# Patient Record
Sex: MALE | Race: BLACK OR AFRICAN AMERICAN | Employment: FULL TIME | URBAN - METROPOLITAN AREA
[De-identification: names, ages, dates, MRNs, and addresses within clinical notes are randomized per-mention and may not be internally consistent; named-entity substitution may affect disease eponyms.]

---

## 2022-12-20 ENCOUNTER — HOSPITAL ENCOUNTER (EMERGENCY)
Age: 27
Discharge: HOME | End: 2022-12-20
Attending: EMERGENCY MEDICINE
Payer: MEDICAID

## 2022-12-20 VITALS
WEIGHT: 166.89 LBS | HEIGHT: 70 IN | RESPIRATION RATE: 15 BRPM | HEART RATE: 60 BPM | TEMPERATURE: 98 F | DIASTOLIC BLOOD PRESSURE: 77 MMHG | OXYGEN SATURATION: 100 % | BODY MASS INDEX: 23.89 KG/M2 | SYSTOLIC BLOOD PRESSURE: 118 MMHG

## 2022-12-20 DIAGNOSIS — A64 STD (MALE): Primary | ICD-10-CM

## 2022-12-20 LAB
C. TRACHOMATIS DNA ,URINE: POSITIVE
N. GONORRHOEAE DNA, URINE: NEGATIVE
URINE TRICHOMONAS EVALUATION: NORMAL

## 2022-12-20 PROCEDURE — 6360000002 HC RX W HCPCS: Performed by: EMERGENCY MEDICINE

## 2022-12-20 PROCEDURE — 99284 EMERGENCY DEPT VISIT MOD MDM: CPT

## 2022-12-20 PROCEDURE — 6370000000 HC RX 637 (ALT 250 FOR IP): Performed by: EMERGENCY MEDICINE

## 2022-12-20 PROCEDURE — 87591 N.GONORRHOEAE DNA AMP PROB: CPT

## 2022-12-20 PROCEDURE — 2500000003 HC RX 250 WO HCPCS: Performed by: EMERGENCY MEDICINE

## 2022-12-20 PROCEDURE — 81015 MICROSCOPIC EXAM OF URINE: CPT

## 2022-12-20 PROCEDURE — 87491 CHLMYD TRACH DNA AMP PROBE: CPT

## 2022-12-20 PROCEDURE — 96372 THER/PROPH/DIAG INJ SC/IM: CPT

## 2022-12-20 RX ORDER — DOXYCYCLINE HYCLATE 100 MG
100 TABLET ORAL ONCE
Status: COMPLETED | OUTPATIENT
Start: 2022-12-20 | End: 2022-12-20

## 2022-12-20 RX ORDER — DOXYCYCLINE HYCLATE 100 MG
100 TABLET ORAL 2 TIMES DAILY
Qty: 20 TABLET | Refills: 0 | Status: SHIPPED | OUTPATIENT
Start: 2022-12-20 | End: 2022-12-30

## 2022-12-20 RX ADMIN — DOXYCYCLINE HYCLATE 100 MG: 100 TABLET, COATED ORAL at 08:42

## 2022-12-20 RX ADMIN — LIDOCAINE HYDROCHLORIDE 500 MG: 10 INJECTION, SOLUTION EPIDURAL; INFILTRATION; INTRACAUDAL; PERINEURAL at 08:41

## 2022-12-20 ASSESSMENT — LIFESTYLE VARIABLES: HOW OFTEN DO YOU HAVE A DRINK CONTAINING ALCOHOL: NEVER

## 2022-12-20 NOTE — ED NOTES
AVS reviewed with patient. Verbalized understanding. AVS was printed and given to patient. Prescriptions sent electronically to patients pharmacy of choice.      Neida Esqueda) Jono Robertson RN  12/20/22 2102

## 2022-12-20 NOTE — DISCHARGE INSTRUCTIONS
You may take Tylenol (acetaminophen) and/or Motrin (ibuprofen) with the medications that are prescribed for you, if you are permitted to take these medications. Please follow package directions for the appropriate dosing and frequency. For Culture Results  Call Medical Records at  21 [phone]  3-5 days after your visit. You must have picture I.D. To obtain results.       FOR MORE INFORMATION ABOUT STDS, CONTACT YOUR PHYSICIAN OR:    Sexually Transmitted Disease Clinic                            [facility] SPECIALTY [facility]                             [address] [facility], [address] [place] [address]. [address]                  [phone]    Sexually [address]                            [facility] - [address]                   Jailene Baez [address]  [place] [address]                  [phone]    [facility]  [address]  Alysia Baez [address]                                         [place] [address]  [phone]               STD Checks  [phone]                                                                        [address]  For an appointment                                                        [address] [place] [address]  Monday [address]   Thursday 8                     [phone]

## 2022-12-20 NOTE — ED PROVIDER NOTES
[address] [employer]      Pt Name: Arik Kate  MRN: [id number]  [place] 1995  Date of evaluation: 12/20/2022  Provider: Kiara Rose DO    CHIEF COMPLAINT  Chief Complaint   Patient presents with    Exposure to STD     Pt states that he thinks he was exposed to chlamydia. Pt denies any symptoms. This patient is at risk for a communicable infection. Therefore, personal protection equipment consisting of a mask was worn for the exam.    HPI  Arik Kate is a 24 y.o. male who presents with urination. He states he has had an STD exposure. He denies any fevers or chills. Denies back pain. Denies any abdominal pain. He denies any penile discharge. Nothing makes it better or worse. Scribes it as moderate. He denies any radiation of his pain. ? REVIEW OF SYSTEMS  All systems negative except as noted in the HPI. Reviewed Nurses' notes and concur. No LMP for male patient. PAST MEDICAL HISTORY  History reviewed. No pertinent past medical history. FAMILY HISTORY  History reviewed. No pertinent family history. SOCIAL HISTORY   reports that he has never smoked. He has never used smokeless tobacco. He reports that he does not drink alcohol and does not use drugs. SURGICAL HISTORY  Past Surgical History:   Procedure Laterality Date    TONSILLECTOMY         CURRENT MEDICATIONS      ALLERGIES  No Known Allergies      PHYSICAL EXAM  VITAL SIGNS: /77   Pulse 60   Temp 98 °F (36.7 °C) (Oral)   Resp 15   Ht 5' 10\" (1.778 m)   Wt 166 lb 14.2 oz (75.7 kg)   SpO2 100%   BMI 23.95 kg/m²    Constitutional: Well-developed, well-nourished, appears normal, nontoxic, activity: Resting comfortably on the cart, no obvious pain, speaking in full sentences, does not appear ill or toxic. Abdomen: Soft, no tender with no distension, no masses, no pulsatile masses, no hepatosplenomegaly, normal bowel sounds.   Skin: Warm, Dry, No erythema, No rash. Back: No tenderness, Full range of motion, No scoliosis. Neurologic: Alert & oriented x 3  Psychiatric: Affect normal, Judgment normal, Mood normal.    LABORATORY  Labs Reviewed   C.TRACHOMATIS N.GONORRHOEAE DNA, URINE   URINE TRICHOMONAS EVALUATION       RADIOLOGY/PROCEDURES  I personally reviewed the images for this case. No orders to display       [facility]  Pertinent Labs reviewed. (See chart for details)    Vitals:    12/20/22 0815   BP: 118/77   Pulse: 60   Resp: 15   Temp: 98 °F (36.7 °C)   TempSrc: Oral   SpO2: 100%   Weight: 166 lb 14.2 oz (75.7 kg)   Height: 5' 10\" (1.778 m)       Medications   cefTRIAXone (ROCEPHIN) 500 mg in lidocaine 1 % 1.4286 mL IM Injection (500 mg IntraMUSCular Given 12/20/22 0841)   doxycycline hyclate (VIBRA-TABS) tablet 100 mg (100 mg Oral Given 12/20/22 0842)       Discharge Medication List as of 12/20/2022  8:46 AM        START taking these medications    Details   doxycycline hyclate (VIBRA-TABS) 100 MG tablet Take 1 tablet by mouth 2 times daily for 10 days, Disp-20 tablet, R-0Normal             Patient remained stable in the ED. his urinalysis did not show any trichomonas. Therefore, patient was discharged on doxycycline. He was given Rocephin in the emergency department. He was given STD instructions including refraining from sex for 7 days. He was instructed to follow-up with his doctor and return if any problems. He was given a list of STD clinics. The patient's blood pressure was not found to be elevated according to CMS/Medicare and the Affordable Care Act/ObamaCare criteria. See discharge instructions for specific medications, discharge information, and treatments. They were verbally instructed to return to emergency if any problems. (This chart has been completed using [address].  Although attempts have been made to ensure accuracy, words and/or phrases may not be transcribed as intended.)    Patient refused pain medicines at the time of his exam.    IMPRESSION(S):  1. STD (male)      ? Recheck Times: 0830    Diagnostic considerations include but are not limited to:    Chlamydia/Gonorrhea that could cause Epididymitis, Epididymo-orchitis, Prostatitis, or even a Johnsons syndrome from Chlamydia, GC arthritis, Trichomonas, Herpes genitalia, Venereal Warts (condyloma acuminata),  Syphilis (Primary-a painless hard chancre after an incubation period of 2-12 weeks, secondary-6-8 weeks after the appearance of the initial chancre, latent-asymptomatic phase, then tertiary which present as Gummas in any organ: 1-10 years after initial infection, Cardiovascular: 10-40 years after initial infection, Neurosyphilis: 5-35 years after infection),   HIV/AIDS (and the many other sequelae of this disease),   Chancroid (Haemophilus ducreyi), Lymphogranuloma venereum or LGV (from Chlamydia trachomatis, Relatively rare in the [place], causing the infamous [pseudo]\"Bubo\"), Granuloma inguinale (from Klebsiella granulomatis, also called lupoid ulceration granuloma of the pudenda and granuloma contagiosa (Extremely rare in the [place]).           Ramirez Armijo DO  12/20/22 1131

## 2022-12-21 NOTE — RESULT ENCOUNTER NOTE
Patient's positive result has been appropriately evaluated by the provider pool. Patient was contacted and notified of the change in treatment plan.     Medication was phoned to the patient's pharmacy per protocol:    Pharmacy:   [facility] [address] [place] [address], [address] [place] [address] [address] [address]  Phone: [phone] Fax: [phone]    Aware positive for chlamydia  to take doxycycline as perscribed and to get rechecked  to inform partner

## 2023-02-09 ENCOUNTER — HOSPITAL ENCOUNTER (EMERGENCY)
Age: 28
Discharge: HOME | End: 2023-02-09
Attending: EMERGENCY MEDICINE
Payer: MEDICAID

## 2023-02-09 VITALS
OXYGEN SATURATION: 95 % | DIASTOLIC BLOOD PRESSURE: 73 MMHG | HEART RATE: 75 BPM | RESPIRATION RATE: 18 BRPM | TEMPERATURE: 98.2 F | SYSTOLIC BLOOD PRESSURE: 150 MMHG

## 2023-02-09 DIAGNOSIS — K64.4 EXTERNAL HEMORRHOID: Primary | ICD-10-CM

## 2023-02-09 PROCEDURE — 99283 EMERGENCY DEPT VISIT LOW MDM: CPT

## 2023-02-09 RX ORDER — LIDOCAINE HYDROCHLORIDE AND HYDROCORTISONE ACETATE 30; 5 MG/G; MG/G
1 CREAM RECTAL DAILY
Qty: 1 KIT | Refills: 1 | Status: SHIPPED | OUTPATIENT
Start: 2023-02-09

## 2023-02-09 RX ORDER — POLYETHYLENE GLYCOL 3350 17 G/17G
17 POWDER, FOR SOLUTION ORAL DAILY
Qty: 1530 G | Refills: 1 | Status: SHIPPED | OUTPATIENT
Start: 2023-02-09 | End: 2023-03-11

## 2023-02-09 ASSESSMENT — PAIN SCALES - GENERAL: PAINLEVEL_OUTOF10: 6

## 2023-02-09 ASSESSMENT — PAIN DESCRIPTION - LOCATION: LOCATION: RECTUM

## 2023-02-09 ASSESSMENT — PAIN - FUNCTIONAL ASSESSMENT: PAIN_FUNCTIONAL_ASSESSMENT: 0-10

## 2023-02-09 ASSESSMENT — PAIN DESCRIPTION - ONSET: ONSET: ON-GOING

## 2023-02-09 ASSESSMENT — PAIN DESCRIPTION - PAIN TYPE: TYPE: ACUTE PAIN

## 2023-02-09 ASSESSMENT — PAIN DESCRIPTION - FREQUENCY: FREQUENCY: CONTINUOUS

## 2023-02-09 ASSESSMENT — PAIN DESCRIPTION - DESCRIPTORS: DESCRIPTORS: ACHING

## 2023-02-09 NOTE — Clinical Note
Funmi Edgar was seen and treated in our emergency department on 2/9/2023. He may return to work on . If you have any questions or concerns, please don't hesitate to call.       Amanda Mohan MD

## 2023-02-10 NOTE — ED NOTES
Pt stable at time of discharge. Discharge medications reviewed. No iv access in place. [address] for d/c at this time.       Zunilda Castellanos, QUYNH  02/09/23 0074

## 2023-02-11 NOTE — ED PROVIDER NOTES
[facility] ENCOUNTER        Pt Name: Davy Davis  MRN: [id number]  [place] 1995  Date of evaluation: 2/9/2023  Provider: Adama Ascencio MD  PCP: No primary care provider on file. Note Started: 5:42 AM EST 2/11/23    CHIEF COMPLAINT       Chief Complaint   Patient presents with    Hemorrhoids     Pt comes in complaining of pain in his rectum and believes it is related to hemorrhoids       HISTORY OF PRESENT ILLNESS: 1 or more Elements   History From: Patient        Davy Davis is a 24 y.o. male who presents for evaluation of rectal pain and a sensation of a mass. Reports 1 week history of symptoms. Denies hematochezia or melena or injury or trauma. Reports he has been having to exert himself to have bowel movements. He also reports he does have to exert himself at work. Denies a history of hemorrhoids. Denies dysuria or scrotal pain or abdominal pain. Nursing Notes were all reviewed and agreed with or any disagreements were addressed in the HPI. REVIEW OF SYSTEMS :      Review of Systems    Positives and Pertinent negatives as per HPI. SURGICAL HISTORY     Past Surgical History:   Procedure Laterality Date    TONSILLECTOMY         CURRENTMEDICATIONS       Discharge Medication List as of 2/9/2023 11:30 PM          ALLERGIES     Patient has no known allergies. FAMILYHISTORY     History reviewed. No pertinent family history.      SOCIAL HISTORY       Social History     Tobacco Use    Smoking status: Never    Smokeless tobacco: Never   Substance Use Topics    Alcohol use: No    Drug use: No       SCREENINGS        Star Coma Scale  Eye Opening: Spontaneous  Best Verbal Response: Oriented  Best Motor Response: Obeys commands  [place] Coma Scale Score: 15                CIWA Assessment  BP: (!) 150/73  Heart Rate: 75           PHYSICAL EXAM  1 or more Elements     ED Triage Vitals [02/09/23 2312]   BP Temp Temp Source Heart Rate Resp SpO2 Height Weight   (!) 150/73 98.2 °F (36.8 °C) Oral 75 18 95 % -- --       Physical Exam  Nursing note reviewed. Exam conducted with a chaperone present. Constitutional:       Appearance: Normal appearance. Abdominal:      General: There is no distension. Palpations: There is no mass. Tenderness: There is no abdominal tenderness. Genitourinary:     Comments: 3 nonthrombosed hemorrhoids noticed around patient's anus. No active hemorrhage. Skin:     Capillary Refill: Capillary refill takes less than 2 seconds. Neurological:      Mental Status: He is alert. DIAGNOSTIC RESULTS   LABS:    Labs Reviewed - No data to display    When ordered only abnormal lab results are displayed. All other labs were within normal range or not returned as of this dictation. EKG:     RADIOLOGY:   Non-plain film images such as CT, Ultrasound and MRI are read by the radiologist. Plain radiographic images are visualized and preliminarily interpreted by the ED Provider with the below findings:      Interpretation per the Radiologist below, if available at the time of this note:    Discussed with Radiologist:     No orders to display     No results found. No results found. PROCEDURES   Unless otherwise noted below, none     Procedures    CRITICAL CARE TIME (.cct)       PAST MEDICAL HISTORY      has no past medical history on file. EMERGENCY DEPARTMENT COURSE and DIFFERENTIAL DIAGNOSIS/MDM:   Vitals:    Vitals:    02/09/23 2312   BP: (!) 150/73   Pulse: 75   Resp: 18   Temp: 98.2 °F (36.8 °C)   TempSrc: Oral   SpO2: 95%       Patient was given the following medications:  Medications - No data to display          Is this patient to be included in the SEP-1 Core Measure due to severe sepsis or septic shock? No   Exclusion criteria - the patient is NOT to be included for SEP-1 Core Measure due to:   Infection is not suspected    CC/HPI Summary, DDx, ED Course, and Reassessment:   Differential diagnosis: Hemorrhoid, perirectal abscess, fistula, rectal prolapse, Crohn's disease, anginal fissure, rectal cancer/other GI cancer, other. 44-year-old male presents ED for rectal pain. With nurse present at bedside annual exam was performed. Patient does appear to have external hemorrhoids. Will be started on topical corticosteroids and analgesics. He was given outpatient referral.     CONSULTS: (Who and What was discussed)  None          Chronic Conditions:     Records Reviewed (External and source):     Disposition Considerations (include 1 Tests not done, Admit vs D/C, Shared Decision Making, Pt Expectation of Test or Tx.): Sitter obtaining Hemoccult but this would cause undue discomfort to patient and the patient denies any recent episodes hematochezia or melena there is no signs of active hemorrhage. Admission to the hospital considered but patient's symptoms are improving. Vital signs and testing performed is reassuring. Based on this patient is appropriate for outpatient management. No indication for admission at this time. Symptomatic treatment with expectant management discussed with the patient and/or family member or surrogates present and they are amenable to treatment plan and outpatient follow-up. Strict return precautions were discussed with the patient and those present. All parties involved were informed that condition may persist or worsen in which case they may then require inpatient treatment, currently there is no indication. They demonstrated understanding of when to return to the emergency department for new or worsening symptoms. I am the Primary Clinician of Record. FINAL IMPRESSION      1.  External hemorrhoid          DISPOSITION/PLAN     DISPOSITION Decision To Discharge 02/09/2023 11:18:25 PM      PATIENT REFERRED TO:  [place] [id number] [place] [address]    Schedule an appointment as soon as possible for a visit   As needed    [address] [facility] [address]  [phone]  Schedule an appointment as soon as possible for a visit today        DISCHARGE MEDICATIONS:  Discharge Medication List as of 2/9/2023 11:30 PM        START taking these medications    Details   Lidocaine-Hydrocortisone Ace 3-0.5 % KIT Place 1 Dose rectally daily, Disp-1 kit, R-1Normal      polyethylene glycol (GLYCOLAX) 17 GM/SCOOP powder Take 17 g by mouth daily, Disp-1530 g, R-1Normal             DISCONTINUED MEDICATIONS:  Discharge Medication List as of 2/9/2023 11:30 PM                 (Please note that portions of this note were completed with a voice recognition program.  Efforts were made to edit the dictations but occasionally words are mis-transcribed.)    Anthony Bates MD (electronically signed)             Anthony Bates MD  02/11/23 3400

## 2024-02-15 ENCOUNTER — HOSPITAL ENCOUNTER (EMERGENCY)
Age: 29
Discharge: HOME | End: 2024-02-15
Attending: EMERGENCY MEDICINE
Payer: COMMERCIAL

## 2024-02-15 VITALS
HEIGHT: 70 IN | TEMPERATURE: 98 F | OXYGEN SATURATION: 99 % | DIASTOLIC BLOOD PRESSURE: 100 MMHG | SYSTOLIC BLOOD PRESSURE: 134 MMHG | RESPIRATION RATE: 20 BRPM | WEIGHT: 157 LBS | HEART RATE: 88 BPM | BODY MASS INDEX: 22.48 KG/M2

## 2024-02-15 DIAGNOSIS — T23.201A PARTIAL THICKNESS BURN OF MULTIPLE SITES OF RIGHT HAND, INITIAL ENCOUNTER: Primary | ICD-10-CM

## 2024-02-15 PROCEDURE — 6370000000 HC RX 637 (ALT 250 FOR IP): Performed by: EMERGENCY MEDICINE

## 2024-02-15 PROCEDURE — 90714 TD VACC NO PRESV 7 YRS+ IM: CPT | Performed by: EMERGENCY MEDICINE

## 2024-02-15 PROCEDURE — 6360000002 HC RX W HCPCS: Performed by: EMERGENCY MEDICINE

## 2024-02-15 PROCEDURE — 99284 EMERGENCY DEPT VISIT MOD MDM: CPT

## 2024-02-15 PROCEDURE — 90471 IMMUNIZATION ADMIN: CPT | Performed by: EMERGENCY MEDICINE

## 2024-02-15 RX ORDER — HYDROCODONE BITARTRATE AND ACETAMINOPHEN 5; 325 MG/1; MG/1
1 TABLET ORAL EVERY 8 HOURS PRN
Qty: 5 TABLET | Refills: 0 | Status: SHIPPED | OUTPATIENT
Start: 2024-02-15 | End: 2024-02-18

## 2024-02-15 RX ORDER — IBUPROFEN 800 MG/1
800 TABLET ORAL EVERY 8 HOURS PRN
Qty: 30 TABLET | Refills: 0 | Status: SHIPPED | OUTPATIENT
Start: 2024-02-15

## 2024-02-15 RX ORDER — BACITRACIN ZINC AND POLYMYXIN B SULFATE 500; 1000 [USP'U]/G; [USP'U]/G
OINTMENT TOPICAL 2 TIMES DAILY
Status: DISCONTINUED | OUTPATIENT
Start: 2024-02-15 | End: 2024-02-15 | Stop reason: HOSPADM

## 2024-02-15 RX ORDER — HYDROCODONE BITARTRATE AND ACETAMINOPHEN 5; 325 MG/1; MG/1
1 TABLET ORAL ONCE
Status: COMPLETED | OUTPATIENT
Start: 2024-02-15 | End: 2024-02-15

## 2024-02-15 RX ADMIN — CLOSTRIDIUM TETANI TOXOID ANTIGEN (FORMALDEHYDE INACTIVATED) AND CORYNEBACTERIUM DIPHTHERIAE TOXOID ANTIGEN (FORMALDEHYDE INACTIVATED) 0.5 ML: 5; 2 INJECTION, SUSPENSION INTRAMUSCULAR at 01:05

## 2024-02-15 RX ADMIN — HYDROCODONE BITARTRATE AND ACETAMINOPHEN 1 TABLET: 5; 325 TABLET ORAL at 01:05
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
## 2024-02-15 NOTE — ED NOTES
Right hand digits 2,3,4, cleaned withy water, polysporin placed, wrapped with adpatic and gauze. Pt. Instructed on care at home.